# Patient Record
Sex: MALE | Race: OTHER | HISPANIC OR LATINO | URBAN - METROPOLITAN AREA
[De-identification: names, ages, dates, MRNs, and addresses within clinical notes are randomized per-mention and may not be internally consistent; named-entity substitution may affect disease eponyms.]

---

## 2021-07-09 ENCOUNTER — OFFICE VISIT (OUTPATIENT)
Dept: FAMILY MEDICINE | Facility: CLINIC | Age: 31
End: 2021-07-09

## 2021-07-09 DIAGNOSIS — R21 FACIAL RASH: Primary | ICD-10-CM

## 2021-07-09 PROCEDURE — 99203 OFFICE O/P NEW LOW 30 MIN: CPT | Mod: 95,,, | Performed by: FAMILY MEDICINE

## 2021-07-09 PROCEDURE — 99203 PR OFFICE/OUTPT VISIT, NEW, LEVL III, 30-44 MIN: ICD-10-PCS | Mod: 95,,, | Performed by: FAMILY MEDICINE

## 2021-07-09 RX ORDER — FLUCONAZOLE 150 MG/1
150 TABLET ORAL DAILY
Qty: 1 TABLET | Refills: 0 | Status: SHIPPED | OUTPATIENT
Start: 2021-07-09 | End: 2021-07-10

## 2021-07-09 RX ORDER — NYSTATIN AND TRIAMCINOLONE ACETONIDE 100000; 1 [USP'U]/G; MG/G
CREAM TOPICAL 2 TIMES DAILY
Qty: 60 G | Refills: 1 | Status: SHIPPED | OUTPATIENT
Start: 2021-07-09 | End: 2023-03-02

## 2023-03-01 ENCOUNTER — TELEPHONE (OUTPATIENT)
Dept: FAMILY MEDICINE | Facility: CLINIC | Age: 33
End: 2023-03-01
Payer: COMMERCIAL

## 2023-03-02 ENCOUNTER — OFFICE VISIT (OUTPATIENT)
Dept: FAMILY MEDICINE | Facility: CLINIC | Age: 33
End: 2023-03-02
Payer: COMMERCIAL

## 2023-03-02 VITALS
SYSTOLIC BLOOD PRESSURE: 138 MMHG | TEMPERATURE: 98 F | RESPIRATION RATE: 18 BRPM | WEIGHT: 217.13 LBS | HEIGHT: 67 IN | HEART RATE: 78 BPM | DIASTOLIC BLOOD PRESSURE: 80 MMHG | OXYGEN SATURATION: 99 % | BODY MASS INDEX: 34.08 KG/M2

## 2023-03-02 DIAGNOSIS — F41.9 ANXIETY AND DEPRESSION: ICD-10-CM

## 2023-03-02 DIAGNOSIS — F90.9 ATTENTION DEFICIT HYPERACTIVITY DISORDER (ADHD), UNSPECIFIED ADHD TYPE: ICD-10-CM

## 2023-03-02 DIAGNOSIS — Z23 NEED FOR TDAP VACCINATION: ICD-10-CM

## 2023-03-02 DIAGNOSIS — R21 FACIAL RASH: ICD-10-CM

## 2023-03-02 DIAGNOSIS — F32.A ANXIETY AND DEPRESSION: ICD-10-CM

## 2023-03-02 DIAGNOSIS — Z11.3 SCREEN FOR STD (SEXUALLY TRANSMITTED DISEASE): ICD-10-CM

## 2023-03-02 DIAGNOSIS — Z00.00 ANNUAL PHYSICAL EXAM: Primary | ICD-10-CM

## 2023-03-02 PROCEDURE — 99999 PR PBB SHADOW E&M-EST. PATIENT-LVL III: ICD-10-PCS | Mod: PBBFAC,,, | Performed by: FAMILY MEDICINE

## 2023-03-02 PROCEDURE — 3079F DIAST BP 80-89 MM HG: CPT | Mod: CPTII,S$GLB,, | Performed by: FAMILY MEDICINE

## 2023-03-02 PROCEDURE — 3075F PR MOST RECENT SYSTOLIC BLOOD PRESS GE 130-139MM HG: ICD-10-PCS | Mod: CPTII,S$GLB,, | Performed by: FAMILY MEDICINE

## 2023-03-02 PROCEDURE — 99999 PR PBB SHADOW E&M-EST. PATIENT-LVL III: CPT | Mod: PBBFAC,,, | Performed by: FAMILY MEDICINE

## 2023-03-02 PROCEDURE — 99395 PREV VISIT EST AGE 18-39: CPT | Mod: 25,S$GLB,, | Performed by: FAMILY MEDICINE

## 2023-03-02 PROCEDURE — 1159F PR MEDICATION LIST DOCUMENTED IN MEDICAL RECORD: ICD-10-PCS | Mod: CPTII,S$GLB,, | Performed by: FAMILY MEDICINE

## 2023-03-02 PROCEDURE — 3079F PR MOST RECENT DIASTOLIC BLOOD PRESSURE 80-89 MM HG: ICD-10-PCS | Mod: CPTII,S$GLB,, | Performed by: FAMILY MEDICINE

## 2023-03-02 PROCEDURE — 90471 IMMUNIZATION ADMIN: CPT | Mod: S$GLB,,, | Performed by: FAMILY MEDICINE

## 2023-03-02 PROCEDURE — 90471 TDAP VACCINE GREATER THAN OR EQUAL TO 7YO IM: ICD-10-PCS | Mod: S$GLB,,, | Performed by: FAMILY MEDICINE

## 2023-03-02 PROCEDURE — 99395 PR PREVENTIVE VISIT,EST,18-39: ICD-10-PCS | Mod: 25,S$GLB,, | Performed by: FAMILY MEDICINE

## 2023-03-02 PROCEDURE — 3008F PR BODY MASS INDEX (BMI) DOCUMENTED: ICD-10-PCS | Mod: CPTII,S$GLB,, | Performed by: FAMILY MEDICINE

## 2023-03-02 PROCEDURE — 3075F SYST BP GE 130 - 139MM HG: CPT | Mod: CPTII,S$GLB,, | Performed by: FAMILY MEDICINE

## 2023-03-02 PROCEDURE — 90715 TDAP VACCINE GREATER THAN OR EQUAL TO 7YO IM: ICD-10-PCS | Mod: S$GLB,,, | Performed by: FAMILY MEDICINE

## 2023-03-02 PROCEDURE — 3008F BODY MASS INDEX DOCD: CPT | Mod: CPTII,S$GLB,, | Performed by: FAMILY MEDICINE

## 2023-03-02 PROCEDURE — 1159F MED LIST DOCD IN RCRD: CPT | Mod: CPTII,S$GLB,, | Performed by: FAMILY MEDICINE

## 2023-03-02 PROCEDURE — 90715 TDAP VACCINE 7 YRS/> IM: CPT | Mod: S$GLB,,, | Performed by: FAMILY MEDICINE

## 2023-03-02 RX ORDER — NYSTATIN AND TRIAMCINOLONE ACETONIDE 100000; 1 [USP'U]/G; MG/G
CREAM TOPICAL 2 TIMES DAILY
Qty: 60 G | Refills: 1 | Status: SHIPPED | OUTPATIENT
Start: 2023-03-02

## 2023-03-02 NOTE — PROGRESS NOTES
Health Maintenance Due   Topic     Hepatitis C Screening  Consult pcp    Lipid Panel  Consult pcp    HIV Screening  Consult pcp    TETANUS VACCINE  Consult pcp    COVID-19 Vaccine (3 - Booster for Moderna series) Not offered at this office

## 2023-03-02 NOTE — PROGRESS NOTES
Pt tolerated tdap vaccine to left deltoid without difficulty; no adverse reaction noted; VIS given

## 2023-03-03 NOTE — PROGRESS NOTES
"Subjective:       Patient ID: Favio Diggs is a 32 y.o. male.    Chief Complaint: Annual Exam and Rash (forehead)      Rash    32-year-old male presents for annual exam.  States he would like refills on the cream that was given for his rash.  States it helps but then worsens with time.  Follows with psych for anxiety depression along with ADHD.  Denies any other issues.        Review of Systems   Constitutional: Negative.    HENT: Negative.     Respiratory: Negative.     Cardiovascular: Negative.    Gastrointestinal: Negative.    Endocrine: Negative.    Genitourinary: Negative.    Musculoskeletal: Negative.    Skin:  Positive for rash.   Neurological: Negative.    Psychiatric/Behavioral: Negative.          History reviewed. No pertinent past medical history.  History reviewed. No pertinent surgical history.  History reviewed. No pertinent family history.  Social History     Socioeconomic History    Marital status:        Current Outpatient Medications:     dextroamphetamine-amphetamine (ADDERALL XR) 15 MG 24 hr capsule, Take 1 capsule by mouth every morning, Disp: 30 capsule, Rfl: 0    EScitalopram oxalate (LEXAPRO) 20 MG tablet, TAKE 1 TABLET BY MOUTH EVERY DAY, Disp: 90 tablet, Rfl: 0    nystatin-triamcinolone (MYCOLOG II) cream, Apply topically 2 (two) times daily., Disp: 60 g, Rfl: 1   Objective:      Vitals:    03/02/23 1550   BP: 138/80   BP Location: Left arm   Patient Position: Sitting   BP Method: Small (Manual)   Pulse: 78   Resp: 18   Temp: 98.4 °F (36.9 °C)   TempSrc: Oral   SpO2: 99%   Weight: 98.5 kg (217 lb 2.5 oz)   Height: 5' 7" (1.702 m)       Physical Exam  Constitutional:       General: He is not in acute distress.  HENT:      Head: Normocephalic and atraumatic.   Eyes:      Conjunctiva/sclera: Conjunctivae normal.   Cardiovascular:      Rate and Rhythm: Normal rate and regular rhythm.      Heart sounds: Normal heart sounds. No murmur heard.    No friction rub. No gallop. "   Pulmonary:      Effort: Pulmonary effort is normal.      Breath sounds: Normal breath sounds. No wheezing or rales.   Musculoskeletal:      Cervical back: Neck supple.   Skin:     General: Skin is warm and dry.   Neurological:      Mental Status: He is alert and oriented to person, place, and time.   Psychiatric:         Behavior: Behavior normal.         Thought Content: Thought content normal.         Judgment: Judgment normal.          Assessment:       1. Annual physical exam    2. Facial rash    3. Screen for STD (sexually transmitted disease)    4. Attention deficit hyperactivity disorder (ADHD), unspecified ADHD type    5. Anxiety and depression    6. Need for Tdap vaccination          Plan:       Annual physical exam  -     CBC Auto Differential; Future; Expected date: 03/02/2023  -     Comprehensive Metabolic Panel; Future; Expected date: 03/02/2023  -     Hemoglobin A1C; Future; Expected date: 03/02/2023  -     TSH; Future; Expected date: 03/02/2023  -     Lipid Panel; Future; Expected date: 03/02/2023    Facial rash  -     nystatin-triamcinolone (MYCOLOG II) cream; Apply topically 2 (two) times daily.  Dispense: 60 g; Refill: 1  -     Ambulatory referral/consult to Dermatology; Future; Expected date: 03/09/2023    Screen for STD (sexually transmitted disease)  -     HIV 1/2 Ag/Ab (4th Gen); Future; Expected date: 03/02/2023  -     RPR; Future; Expected date: 03/02/2023  -     C. trachomatis/N. gonorrhoeae by AMP DNA [facility]; Urine; Future; Expected date: 03/02/2023  -     HEPATITIS C ANTIBODY; Future; Expected date: 03/02/2023    Attention deficit hyperactivity disorder (ADHD), unspecified ADHD type    Anxiety and depression    Need for Tdap vaccination  -     (In Office Administered) Tdap Vaccine    F/u labs. Cont w/ cream but advised pt to f/u w/ derm for eval due to reoccurrence.           Future Appointments   Date Time Provider Department Center   3/6/2023  7:30 AM [facility]    3/6/2023  7:45 AM [facility]       Patient note was created using DearLocal.  Any errors in syntax or even information may not have been identified and edited on initial review prior to signing this note.

## 2023-03-06 ENCOUNTER — TELEPHONE (OUTPATIENT)
Dept: FAMILY MEDICINE | Facility: CLINIC | Age: 33
End: 2023-03-06
Payer: COMMERCIAL

## 2023-03-06 ENCOUNTER — LAB VISIT (OUTPATIENT)
Dept: LAB | Facility: HOSPITAL | Age: 33
End: 2023-03-06
Attending: FAMILY MEDICINE
Payer: COMMERCIAL

## 2023-03-06 DIAGNOSIS — Z00.00 ANNUAL PHYSICAL EXAM: ICD-10-CM

## 2023-03-06 DIAGNOSIS — Z11.3 SCREEN FOR STD (SEXUALLY TRANSMITTED DISEASE): ICD-10-CM

## 2023-03-06 LAB
ALBUMIN SERPL BCP-MCNC: 4 G/DL (ref 3.5–5.2)
ALP SERPL-CCNC: 98 U/L (ref 55–135)
ALT SERPL W/O P-5'-P-CCNC: 41 U/L (ref 10–44)
ANION GAP SERPL CALC-SCNC: 8 MMOL/L (ref 8–16)
AST SERPL-CCNC: 25 U/L (ref 10–40)
BASOPHILS # BLD AUTO: 0.05 K/UL (ref 0–0.2)
BASOPHILS NFR BLD: 0.6 % (ref 0–1.9)
BILIRUB SERPL-MCNC: 0.4 MG/DL (ref 0.1–1)
BUN SERPL-MCNC: 9 MG/DL (ref 6–20)
CALCIUM SERPL-MCNC: 9.7 MG/DL (ref 8.7–10.5)
CHLORIDE SERPL-SCNC: 101 MMOL/L (ref 95–110)
CHOLEST SERPL-MCNC: 222 MG/DL (ref 120–199)
CHOLEST/HDLC SERPL: 5.3 {RATIO} (ref 2–5)
CO2 SERPL-SCNC: 29 MMOL/L (ref 23–29)
CREAT SERPL-MCNC: 1 MG/DL (ref 0.5–1.4)
DIFFERENTIAL METHOD: ABNORMAL
EOSINOPHIL # BLD AUTO: 0.3 K/UL (ref 0–0.5)
EOSINOPHIL NFR BLD: 3.2 % (ref 0–8)
ERYTHROCYTE [DISTWIDTH] IN BLOOD BY AUTOMATED COUNT: 13.3 % (ref 11.5–14.5)
EST. GFR  (NO RACE VARIABLE): >60 ML/MIN/1.73 M^2
ESTIMATED AVG GLUCOSE: 100 MG/DL (ref 68–131)
GLUCOSE SERPL-MCNC: 89 MG/DL (ref 70–110)
HBA1C MFR BLD: 5.1 % (ref 4–5.6)
HCT VFR BLD AUTO: 44.4 % (ref 40–54)
HCV AB SERPL QL IA: NORMAL
HDLC SERPL-MCNC: 42 MG/DL (ref 40–75)
HDLC SERPL: 18.9 % (ref 20–50)
HGB BLD-MCNC: 14.1 G/DL (ref 14–18)
HIV 1+2 AB+HIV1 P24 AG SERPL QL IA: NORMAL
IMM GRANULOCYTES # BLD AUTO: 0.01 K/UL (ref 0–0.04)
IMM GRANULOCYTES NFR BLD AUTO: 0.1 % (ref 0–0.5)
LDLC SERPL CALC-MCNC: 160 MG/DL (ref 63–159)
LYMPHOCYTES # BLD AUTO: 2.7 K/UL (ref 1–4.8)
LYMPHOCYTES NFR BLD: 34.9 % (ref 18–48)
MCH RBC QN AUTO: 27.6 PG (ref 27–31)
MCHC RBC AUTO-ENTMCNC: 31.8 G/DL (ref 32–36)
MCV RBC AUTO: 87 FL (ref 82–98)
MONOCYTES # BLD AUTO: 0.5 K/UL (ref 0.3–1)
MONOCYTES NFR BLD: 6.6 % (ref 4–15)
NEUTROPHILS # BLD AUTO: 4.2 K/UL (ref 1.8–7.7)
NEUTROPHILS NFR BLD: 54.6 % (ref 38–73)
NONHDLC SERPL-MCNC: 180 MG/DL
NRBC BLD-RTO: 0 /100 WBC
PLATELET # BLD AUTO: 297 K/UL (ref 150–450)
PMV BLD AUTO: 11.6 FL (ref 9.2–12.9)
POTASSIUM SERPL-SCNC: 3.7 MMOL/L (ref 3.5–5.1)
PROT SERPL-MCNC: 7.4 G/DL (ref 6–8.4)
RBC # BLD AUTO: 5.1 M/UL (ref 4.6–6.2)
SODIUM SERPL-SCNC: 138 MMOL/L (ref 136–145)
TRIGL SERPL-MCNC: 100 MG/DL (ref 30–150)
TSH SERPL DL<=0.005 MIU/L-ACNC: 1.43 UIU/ML (ref 0.4–4)
WBC # BLD AUTO: 7.76 K/UL (ref 3.9–12.7)

## 2023-03-06 PROCEDURE — 80061 LIPID PANEL: CPT | Performed by: FAMILY MEDICINE

## 2023-03-06 PROCEDURE — 84443 ASSAY THYROID STIM HORMONE: CPT | Performed by: FAMILY MEDICINE

## 2023-03-06 PROCEDURE — 86803 HEPATITIS C AB TEST: CPT | Performed by: FAMILY MEDICINE

## 2023-03-06 PROCEDURE — 87389 HIV-1 AG W/HIV-1&-2 AB AG IA: CPT | Performed by: FAMILY MEDICINE

## 2023-03-06 PROCEDURE — 36415 COLL VENOUS BLD VENIPUNCTURE: CPT | Mod: PO | Performed by: FAMILY MEDICINE

## 2023-03-06 PROCEDURE — 80053 COMPREHEN METABOLIC PANEL: CPT | Performed by: FAMILY MEDICINE

## 2023-03-06 PROCEDURE — 86592 SYPHILIS TEST NON-TREP QUAL: CPT | Performed by: FAMILY MEDICINE

## 2023-03-06 PROCEDURE — 83036 HEMOGLOBIN GLYCOSYLATED A1C: CPT | Performed by: FAMILY MEDICINE

## 2023-03-06 PROCEDURE — 85025 COMPLETE CBC W/AUTO DIFF WBC: CPT | Performed by: FAMILY MEDICINE

## 2023-03-06 NOTE — TELEPHONE ENCOUNTER
Spoke with patient states that he will schedule his appoint through the portal , referral will defer , verbalized understand .

## 2023-03-07 LAB — RPR SER QL: NORMAL

## 2024-09-24 ENCOUNTER — PATIENT MESSAGE (OUTPATIENT)
Dept: DERMATOLOGY | Facility: CLINIC | Age: 34
End: 2024-09-24
Payer: COMMERCIAL

## 2024-09-26 ENCOUNTER — TELEPHONE (OUTPATIENT)
Dept: DERMATOLOGY | Facility: CLINIC | Age: 34
End: 2024-09-26
Payer: COMMERCIAL

## 2024-09-27 ENCOUNTER — TELEPHONE (OUTPATIENT)
Dept: DERMATOLOGY | Facility: CLINIC | Age: 34
End: 2024-09-27
Payer: COMMERCIAL

## 2024-09-27 ENCOUNTER — PATIENT MESSAGE (OUTPATIENT)
Dept: DERMATOLOGY | Facility: CLINIC | Age: 34
End: 2024-09-27
Payer: COMMERCIAL

## 2024-10-25 ENCOUNTER — OFFICE VISIT (OUTPATIENT)
Dept: FAMILY MEDICINE | Facility: CLINIC | Age: 34
End: 2024-10-25
Payer: COMMERCIAL

## 2024-10-25 VITALS
BODY MASS INDEX: 34.26 KG/M2 | TEMPERATURE: 99 F | OXYGEN SATURATION: 99 % | WEIGHT: 218.25 LBS | HEART RATE: 83 BPM | SYSTOLIC BLOOD PRESSURE: 132 MMHG | HEIGHT: 67 IN | DIASTOLIC BLOOD PRESSURE: 60 MMHG | RESPIRATION RATE: 18 BRPM

## 2024-10-25 DIAGNOSIS — R21 FACIAL RASH: Primary | ICD-10-CM

## 2024-10-25 PROCEDURE — 99999 PR PBB SHADOW E&M-EST. PATIENT-LVL IV: CPT | Mod: PBBFAC,,,

## 2024-10-25 RX ORDER — NYSTATIN AND TRIAMCINOLONE ACETONIDE 100000; 1 [USP'U]/G; MG/G
CREAM TOPICAL 2 TIMES DAILY
Qty: 60 G | Refills: 1 | Status: SHIPPED | OUTPATIENT
Start: 2024-10-25

## 2024-10-25 RX ORDER — FLUCONAZOLE 150 MG/1
150 TABLET ORAL DAILY
Qty: 1 TABLET | Refills: 0 | Status: SHIPPED | OUTPATIENT
Start: 2024-10-25 | End: 2024-10-26

## 2024-10-25 NOTE — PROGRESS NOTES
Family Medicine     Patient name: Favio Diggs  MRN: [id number]  : 1990  PCP NAME: Della Mcallister MD    Subjective     History of Present Illness:  Patient ID: Favio Diggs is a 33 y.o. Other male presents to the clinic today. Chronic medical issues, if present, have been documented.   There are no active problems to display for this patient.      Chief Complaint  Chief Complaint   Patient presents with    Rash     Rash on face and ankle      Complains of rash on the face.  Intermittent over the last 3 years.  Pruritic.  Has responded to topical antifungal plus steroids.     Medications   Medication List with Changes/Refills   New Medications    FLUCONAZOLE (DIFLUCAN) 150 MG TAB    Take 1 tablet (150 mg total) by mouth once daily. for 1 day   Current Medications    DEXTROAMPHETAMINE-AMPHETAMINE (ADDERALL XR) 10 MG 24 HR CAPSULE    Take 1 capsule by mouth every morning    DEXTROAMPHETAMINE-AMPHETAMINE (ADDERALL XR) 15 MG 24 HR CAPSULE    Take 1 capsule by mouth every morning    DEXTROAMPHETAMINE-AMPHETAMINE (ADDERALL XR) 15 MG 24 HR CAPSULE    Take 1 capsule by mouth every morning    ESCITALOPRAM OXALATE (LEXAPRO) 20 MG TABLET    TAKE 1 TABLET BY MOUTH EVERY DAY    ESCITALOPRAM OXALATE (LEXAPRO) 20 MG TABLET    TAKE 1 TABLET BY MOUTH EVERY DAY    ESCITALOPRAM OXALATE (LEXAPRO) 20 MG TABLET    TAKE 1 TABLET BY MOUTH EVERY DAY   Changed and/or Refilled Medications    Modified Medication Previous Medication    NYSTATIN-TRIAMCINOLONE (MYCOLOG II) CREAM nystatin-triamcinolone (MYCOLOG II) cream       Apply topically 2 (two) times daily.    Apply topically 2 (two) times daily.       Allergies  Review of patient's allergies indicates:  No Known Allergies  Past Medical history  History reviewed. No pertinent past medical history.       Surgical History  History reviewed. No pertinent surgical history.  Family History  No family history on file.  Social History     "      Review of system     ROS  Negative except as mentioned above  Physical exam   Vital Signs  Vitals:    10/25/24 1455   BP: 132/60   Pulse: 83   Resp: 18   Temp: 98.5 °F (36.9 °C)   TempSrc: Oral   SpO2: 99%   Weight: 99 kg (218 lb 4.1 oz)   Height: 5' 7" (1.702 m)       Physical Exam  Constitutional:       General: He is not in acute distress.     Appearance: He is not ill-appearing.   HENT:      Head: Normocephalic.   Cardiovascular:      Rate and Rhythm: Normal rate and regular rhythm.      Pulses: Normal pulses.      Heart sounds: Normal heart sounds. No murmur heard.     No gallop.   Pulmonary:      Effort: Pulmonary effort is normal. No respiratory distress.      Breath sounds: Normal breath sounds. No wheezing.   Abdominal:      General: There is no distension.      Palpations: Abdomen is soft.      Tenderness: There is no abdominal tenderness.   Musculoskeletal:      Right lower leg: No edema.      Left lower leg: No edema.   Skin:     Findings: Rash (face and ankle (see image)) present.   Neurological:      Mental Status: He is alert and oriented to person, place, and time.   Psychiatric:         Mood and Affect: Mood normal.           Wt Readings from Last 3 Encounters:   10/25/24 99 kg (218 lb 4.1 oz)   03/02/23 98.5 kg (217 lb 2.5 oz)          Body mass index is 34.18 kg/m².      Laboratory data and other diagnostic findings     Lab Results   Component Value Date    WBC 7.76 03/06/2023    HGB 14.1 03/06/2023    HCT 44.4 03/06/2023    MCV 87 03/06/2023     03/06/2023         Lab Results   Component Value Date    CREATININE 1.0 03/06/2023    BUN 9 03/06/2023     03/06/2023    K 3.7 03/06/2023     03/06/2023    CO2 29 03/06/2023         Assessment and plan       Facial rash  Reviewed past documentation other providers.  He has had similar complaints in the past.  Rash looks eczematoid.  Since he has responded to topical and oral combination in the past, we will send in script  -     " fluconazole (DIFLUCAN) 150 MG Tab; Take 1 tablet (150 mg total) by mouth once daily. for 1 day  Dispense: 1 tablet; Refill: 0  -     nystatin-triamcinolone (MYCOLOG II) cream; Apply topically 2 (two) times daily.  Dispense: 60 g; Refill: 1  -     COMPREHENSIVE METABOLIC PANEL; Future; Expected date: 10/25/2024  -     IGE; Future; Expected date: 10/25/2024  -     CBC W/ AUTO DIFFERENTIAL; Future; Expected date: 10/25/2024        Problem List Items Addressed This Visit    None  Visit Diagnoses       Facial rash    -  Primary    Relevant Medications    fluconazole (DIFLUCAN) 150 MG Tab    nystatin-triamcinolone (MYCOLOG II) cream    Other Relevant Orders    COMPREHENSIVE METABOLIC PANEL    IGE    CBC W/ AUTO DIFFERENTIAL                Medications Administered this visit       Future Appointments  Future Appointments   Date Time Provider Department Center   1/31/2025  2:00 PM Janusz Estrada MD [facility]         No follow-ups on file. and PRN.      Della Mcallister MD    This office note was created by combination of typed  and MModal dictation.  Transcription errors may be present.  If there are any questions, please contact me.

## 2024-10-29 ENCOUNTER — LAB VISIT (OUTPATIENT)
Dept: LAB | Facility: HOSPITAL | Age: 34
End: 2024-10-29
Payer: COMMERCIAL

## 2024-10-29 DIAGNOSIS — R21 FACIAL RASH: ICD-10-CM

## 2024-10-29 LAB
ALBUMIN SERPL BCP-MCNC: 4.2 G/DL (ref 3.5–5.2)
ALP SERPL-CCNC: 97 U/L (ref 40–150)
ALT SERPL W/O P-5'-P-CCNC: 30 U/L (ref 10–44)
ANION GAP SERPL CALC-SCNC: 11 MMOL/L (ref 8–16)
AST SERPL-CCNC: 22 U/L (ref 10–40)
BASOPHILS # BLD AUTO: 0.07 K/UL (ref 0–0.2)
BASOPHILS NFR BLD: 0.8 % (ref 0–1.9)
BILIRUB SERPL-MCNC: 0.5 MG/DL (ref 0.1–1)
BUN SERPL-MCNC: 8 MG/DL (ref 6–20)
CALCIUM SERPL-MCNC: 9.8 MG/DL (ref 8.7–10.5)
CHLORIDE SERPL-SCNC: 103 MMOL/L (ref 95–110)
CO2 SERPL-SCNC: 27 MMOL/L (ref 23–29)
CREAT SERPL-MCNC: 1.1 MG/DL (ref 0.5–1.4)
DIFFERENTIAL METHOD BLD: NORMAL
EOSINOPHIL # BLD AUTO: 0.1 K/UL (ref 0–0.5)
EOSINOPHIL NFR BLD: 1.5 % (ref 0–8)
ERYTHROCYTE [DISTWIDTH] IN BLOOD BY AUTOMATED COUNT: 13.5 % (ref 11.5–14.5)
EST. GFR  (NO RACE VARIABLE): >60 ML/MIN/1.73 M^2
GLUCOSE SERPL-MCNC: 90 MG/DL (ref 70–110)
HCT VFR BLD AUTO: 46.8 % (ref 40–54)
HGB BLD-MCNC: 15.2 G/DL (ref 14–18)
IGE SERPL-ACNC: 118 IU/ML (ref 0–100)
IMM GRANULOCYTES # BLD AUTO: 0.02 K/UL (ref 0–0.04)
IMM GRANULOCYTES NFR BLD AUTO: 0.2 % (ref 0–0.5)
LYMPHOCYTES # BLD AUTO: 3 K/UL (ref 1–4.8)
LYMPHOCYTES NFR BLD: 35.3 % (ref 18–48)
MCH RBC QN AUTO: 27.9 PG (ref 27–31)
MCHC RBC AUTO-ENTMCNC: 32.5 G/DL (ref 32–36)
MCV RBC AUTO: 86 FL (ref 82–98)
MONOCYTES # BLD AUTO: 0.5 K/UL (ref 0.3–1)
MONOCYTES NFR BLD: 6.3 % (ref 4–15)
NEUTROPHILS # BLD AUTO: 4.7 K/UL (ref 1.8–7.7)
NEUTROPHILS NFR BLD: 55.9 % (ref 38–73)
NRBC BLD-RTO: 0 /100 WBC
PLATELET # BLD AUTO: 299 K/UL (ref 150–450)
PMV BLD AUTO: 11.8 FL (ref 9.2–12.9)
POTASSIUM SERPL-SCNC: 4.1 MMOL/L (ref 3.5–5.1)
PROT SERPL-MCNC: 7.8 G/DL (ref 6–8.4)
RBC # BLD AUTO: 5.45 M/UL (ref 4.6–6.2)
SODIUM SERPL-SCNC: 141 MMOL/L (ref 136–145)
WBC # BLD AUTO: 8.45 K/UL (ref 3.9–12.7)

## 2024-10-29 PROCEDURE — 82785 ASSAY OF IGE: CPT

## 2024-10-29 PROCEDURE — 85025 COMPLETE CBC W/AUTO DIFF WBC: CPT

## 2024-10-29 PROCEDURE — 80053 COMPREHEN METABOLIC PANEL: CPT

## 2024-10-29 PROCEDURE — 36415 COLL VENOUS BLD VENIPUNCTURE: CPT | Mod: PO

## 2025-01-17 ENCOUNTER — OFFICE VISIT (OUTPATIENT)
Dept: FAMILY MEDICINE | Facility: CLINIC | Age: 35
End: 2025-01-17
Payer: COMMERCIAL

## 2025-01-17 VITALS
HEIGHT: 67 IN | RESPIRATION RATE: 18 BRPM | WEIGHT: 216.94 LBS | SYSTOLIC BLOOD PRESSURE: 189 MMHG | OXYGEN SATURATION: 91 % | BODY MASS INDEX: 34.05 KG/M2 | DIASTOLIC BLOOD PRESSURE: 113 MMHG | HEART RATE: 88 BPM | TEMPERATURE: 99 F

## 2025-01-17 DIAGNOSIS — R03.0 ELEVATED BLOOD PRESSURE READING: ICD-10-CM

## 2025-01-17 DIAGNOSIS — F90.9 ATTENTION DEFICIT HYPERACTIVITY DISORDER (ADHD), UNSPECIFIED ADHD TYPE: ICD-10-CM

## 2025-01-17 DIAGNOSIS — Z00.00 ANNUAL PHYSICAL EXAM: Primary | ICD-10-CM

## 2025-01-17 PROCEDURE — 99395 PREV VISIT EST AGE 18-39: CPT | Mod: S$GLB,,,

## 2025-01-17 PROCEDURE — 3080F DIAST BP >= 90 MM HG: CPT | Mod: CPTII,S$GLB,,

## 2025-01-17 PROCEDURE — 3077F SYST BP >= 140 MM HG: CPT | Mod: CPTII,S$GLB,,

## 2025-01-17 PROCEDURE — 3008F BODY MASS INDEX DOCD: CPT | Mod: CPTII,S$GLB,,

## 2025-01-17 PROCEDURE — 1160F RVW MEDS BY RX/DR IN RCRD: CPT | Mod: CPTII,S$GLB,,

## 2025-01-17 PROCEDURE — 99999 PR PBB SHADOW E&M-EST. PATIENT-LVL IV: CPT | Mod: PBBFAC,,,

## 2025-01-17 PROCEDURE — 1159F MED LIST DOCD IN RCRD: CPT | Mod: CPTII,S$GLB,,

## 2025-01-17 NOTE — PROGRESS NOTES
Family Medicine     Patient name: Favio Diggs  MRN: [id number]  : 1990  PCP NAME: Della Mcallister MD    Active Problem List with Overview Notes    Diagnosis Date Noted    Attention deficit hyperactivity disorder (ADHD) 2025       History of Present Illness    Patient presents today for annual physical exam   Currently feels at baseline. Works with [employer] in the [employer] department. He reports that his job is very stressful.    He reports experiencing palpitations described as skipping or fluttering sensations for approximately one year, occurring once or twice per month. He denies associated chest pain, shortness of breath, or syncope. He denies history of high blood pressure or concerning blood pressure readings. He does not check blood pressure at home and has never been on blood pressure medication.    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    He reports consuming large amounts of caffeine.    His mother has hypertension. He denies family history of cardiac disease/sudden death before age 50.    ROS:  General: -fever, -chills, -fatigue, -weight gain, -weight loss  Eyes: -vision changes, -redness, -discharge  ENT: -ear pain, -nasal congestion, -sore throat  Cardiovascular: -chest pain, +palpitations, -lower extremity edema  Respiratory: -cough, -shortness of breath  Gastrointestinal: -abdominal pain, -nausea, -vomiting, -diarrhea, -constipation, -blood in stool  Genitourinary: -dysuria, -hematuria, -frequency  Musculoskeletal: -joint pain, -muscle pain  Skin: -rash, -lesion  Neurological: -headache, -dizziness, -numbness, -tingling  Psychiatric: -anxiety, -depression, -sleep difficulty          History reviewed. No pertinent past medical history.    History reviewed. No pertinent surgical history.     No family history on file.     Social History     Socioeconomic History     "Marital status:      Social Drivers of Health     Financial Resource Strain: Medium Risk (10/24/2024)    Overall Financial Resource Strain (CARDIA)     Difficulty of Paying Living Expenses: Somewhat hard   Food Insecurity: No Food Insecurity (10/24/2024)    Hunger Vital Sign     Worried About Running Out of Food in the Last Year: Never true     Ran Out of Food in the Last Year: Never true   Physical Activity: Insufficiently Active (10/24/2024)    Exercise Vital Sign     Days of Exercise per Week: 2 days     Minutes of Exercise per Session: 10 min   Stress: Stress Concern Present (10/24/2024)    [employer] [place] [employer] - Occupational Stress Questionnaire     Feeling of Stress : To some extent   Housing Stability: Unknown (10/24/2024)    Housing Stability Vital Sign     Unable to Pay for Housing in the Last Year: No       BP (!) 189/113   Pulse 88   Temp 98.7 °F (37.1 °C) (Oral)   Resp 18   Ht 5' 7" (1.702 m)   Wt 98.4 kg (216 lb 14.9 oz)   SpO2 (!) 91%   BMI 33.98 kg/m²     Physical Exam    General: No acute distress. Well-developed. Well-nourished.  Eyes: EOMI. Sclerae anicteric.  HENT: Normocephalic. Atraumatic. Nares patent. Moist oral mucosa.  Ears: Bilateral TMs clear. Bilateral EACs clear.  Cardiovascular: Regular rate. Regular rhythm. No murmurs. No rubs. No gallops. Normal S1, S2.  Respiratory: Normal respiratory effort. Clear to auscultation bilaterally. No rales. No rhonchi. No wheezing.  Abdomen: Soft. Non-tender. Non-distended. Normoactive bowel sounds.  Musculoskeletal: No  obvious deformity.  Extremities: No lower extremity edema.  Neurological: Alert & oriented x3. No slurred speech. Normal gait.  Psychiatric: Normal mood. Normal affect. Good insight. Good judgment.  Skin: Warm. Dry. No rash.            Assessment & Plan         Favio Desir" was seen today for annual exam and heart problem.    Diagnoses and all orders for this visit:    Annual physical " "exam  Counseled on age appropriate medical preventative services, including age appropriate cancer screenings, over all nutritional health, need for a consistent exercise regimen and an over all push towards maintaining a vigorous and active lifestyle. Counseled on age appropriate vaccines and discussed upcoming health care needs based on age/gender. Spent time with patient counseling on need for a good patient/doctor relationship moving forward. Discussed use of common OTC medications and supplements. Discussed common dietary aids and use of caffeine and the need for good sleep hygiene and stress management.  Recommend daily sun protection/avoidance and use of at least SPF 30   STRESS MANAGEMENT:  - Discussed the importance of stress reduction and disconnecting from digital devices for overall health.  - Noted that the patient reports being busy and having limited relaxation activities, mainly playing games on phone or console.  - Observed the patient's lack of disconnected relaxation time and constant digital connectivity.  - Explained the benefits of meditation and mindfulness practices for stress management.  - Instructed the patient to practice daily meditation or mindfulness for 10 minutes, focusing on breathing and relaxation.  - Recommend daily relaxation practices and limiting Adderall use to workdays only to manage work-related stress.  - Recommend implementing regular "disconnect" periods from digital devices, including phone and smartwatch.  - Emphasized the importance of disconnecting and taking time to decompress without digital stimuli.  -     Lipid Panel; Future  -     TSH; Future  -     Hemoglobin A1C; Future  -     Comprehensive Metabolic Panel; Future  -     CBC Auto Differential; Future  -     Urinalysis; Future    Elevated blood pressure reading  Observed high systolic blood pressure of 170s and 180s  and acknowledged that patient's use of caffeine and adderall can elevate blood pressure and " increase heart rate.  -     Urinalysis; Future  -     Aldosterone/Renin Activity Ratio; Future  -     Microalbumin/Creatinine Ratio, Urine; Future    Attention deficit hyperactivity disorder (ADHD), unspecified ADHD type        Recommend limiting Adderall use to workdays (Monday to Friday) only.        Problem List Items Addressed This Visit       Attention deficit hyperactivity disorder (ADHD)     Other Visit Diagnoses       Annual physical exam    -  Primary    Relevant Orders    Lipid Panel    TSH    Hemoglobin A1C    Comprehensive Metabolic Panel    CBC Auto Differential    Urinalysis    Elevated blood pressure reading        Relevant Orders    Urinalysis    Aldosterone/Renin Activity Ratio    Microalbumin/Creatinine Ratio, Urine              Medication List with Changes/Refills   Current Medications    DEXTROAMPHETAMINE-AMPHETAMINE (ADDERALL XR) 10 MG 24 HR CAPSULE    Take 1 capsule by mouth every morning    DEXTROAMPHETAMINE-AMPHETAMINE (ADDERALL XR) 15 MG 24 HR CAPSULE    Take 1 capsule by mouth every morning    DEXTROAMPHETAMINE-AMPHETAMINE (ADDERALL XR) 15 MG 24 HR CAPSULE    Take 1 capsule by mouth every morning    ESCITALOPRAM OXALATE (LEXAPRO) 20 MG TABLET    TAKE 1 TABLET BY MOUTH EVERY DAY    ESCITALOPRAM OXALATE (LEXAPRO) 20 MG TABLET    TAKE 1 TABLET BY MOUTH EVERY DAY    ESCITALOPRAM OXALATE (LEXAPRO) 20 MG TABLET    TAKE 1 TABLET BY MOUTH EVERY DAY    NYSTATIN-TRIAMCINOLONE (MYCOLOG II) CREAM    Apply topically 2 (two) times daily.         Follow up for 1 week for blood pressure check .    Della Mcallister MD        This note was generated with the assistance of ambient listening technology. Verbal consent was obtained by the patient and accompanying visitor(s) for the recording of patient appointment to facilitate this note. I attest to having reviewed and edited the generated note for accuracy, though some syntax or spelling errors may persist. Please contact the author of this note for any  clarification.

## 2025-01-17 NOTE — PROGRESS NOTES
Family Medicine     Patient name: Favio Diggs  MRN: [id number]  : 1990  PCP NAME: Della Mcallister MD    Subjective       Patient ID: Favio Diggs is a 34 y.o. Other male presents to the clinic today for an annual physical exam. Chronic medical issues, if present, have been documented.   Active Problem List with Overview Notes    Diagnosis Date Noted    Attention deficit hyperactivity disorder (ADHD) 2025       Current History  Chief Complaint   Patient presents with    Annual Exam     Heart health     Heart Problem     Heart palpitations        History of Present Illness    Patient presents today for annual physical exam   Currently feels at baseline. Works with [employer] in the [employer] department.   He reports experiencing palpitations described as skipping or fluttering sensations for approximately one year, occurring once or twice per month. He denies associated chest pain, shortness of breath, or syncope. He denies history of high blood pressure or concerning blood pressure readings. He does not check blood pressure at home and has never been on blood pressure medication.    He has been taking Adderall since approximately 9942-1594, reporting it helps him stay focused with data tasks and provides energy.    He reports consuming large amounts of caffeine.    His mother has hypertension. He denies family history of cardiac disease/sudden death before age 50.      ROS:  General: -fever, -chills, -fatigue, -weight gain, -weight loss  Eyes: -vision changes, -redness, -discharge  ENT: -ear pain, -nasal congestion, -sore throat  Cardiovascular: -chest pain, +palpitations, -lower extremity edema  Respiratory: -cough, -shortness of breath  Gastrointestinal: -abdominal pain, -nausea, -vomiting, -diarrhea, -constipation, -blood in stool  Genitourinary: -dysuria, -hematuria, -frequency  Musculoskeletal: -joint pain, -muscle pain  Skin: -rash, -lesion  Neurological:  "-headache, -dizziness, -numbness, -tingling  Psychiatric: -anxiety, -depression, -sleep difficulty          Medications   Medication List with Changes/Refills   Current Medications    DEXTROAMPHETAMINE-AMPHETAMINE (ADDERALL XR) 10 MG 24 HR CAPSULE    Take 1 capsule by mouth every morning    DEXTROAMPHETAMINE-AMPHETAMINE (ADDERALL XR) 15 MG 24 HR CAPSULE    Take 1 capsule by mouth every morning    DEXTROAMPHETAMINE-AMPHETAMINE (ADDERALL XR) 15 MG 24 HR CAPSULE    Take 1 capsule by mouth every morning    ESCITALOPRAM OXALATE (LEXAPRO) 20 MG TABLET    TAKE 1 TABLET BY MOUTH EVERY DAY    ESCITALOPRAM OXALATE (LEXAPRO) 20 MG TABLET    TAKE 1 TABLET BY MOUTH EVERY DAY    ESCITALOPRAM OXALATE (LEXAPRO) 20 MG TABLET    TAKE 1 TABLET BY MOUTH EVERY DAY    NYSTATIN-TRIAMCINOLONE (MYCOLOG II) CREAM    Apply topically 2 (two) times daily.       Allergies  Review of patient's allergies indicates:  No Known Allergies  Past Medical history  History reviewed. No pertinent past medical history.       Surgical History  History reviewed. No pertinent surgical history.  Family History  No family history on file.  Social History        Health Maintainence:   Health Maintenance Due   Topic Date Due    COVID-19 Vaccine (3 - 2024-25 season) 09/01/2024          Review of system     ROS  Negative except as mentioned above  Physical exam   Vital Signs  Vitals:    01/17/25 1621 01/17/25 1646   BP: (!) 172/118 (!) 189/113   Pulse: 88    Resp: 18    Temp: 98.7 °F (37.1 °C)    TempSrc: Oral    SpO2: (!) 91%    Weight: 98.4 kg (216 lb 14.9 oz)    Height: 5' 7" (1.702 m)        Physical Exam  Constitutional:       General: He is not in acute distress.     Appearance: He is not ill-appearing.   HENT:      Head: Normocephalic.   Cardiovascular:      Rate and Rhythm: Normal rate and regular rhythm.      Pulses: Normal pulses.      Heart sounds: Normal heart sounds. No murmur heard.     No gallop.   Pulmonary:      Effort: Pulmonary effort is normal. " No respiratory distress.      Breath sounds: Normal breath sounds. No wheezing.   Abdominal:      General: There is no distension.      Palpations: Abdomen is soft.      Tenderness: There is no abdominal tenderness.   Musculoskeletal:      Right lower leg: No edema.      Left lower leg: No edema.   Neurological:      Mental Status: He is alert and oriented to person, place, and time.   Psychiatric:         Mood and Affect: Mood normal.       Laboratory data and other diagnostic findings     Previous labs reviewed.    Lab Results   Component Value Date    WBC 8.45 10/29/2024    HGB 15.2 10/29/2024    HCT 46.8 10/29/2024     10/29/2024    CHOL 222 (H) 03/06/2023    TRIG 100 03/06/2023    HDL 42 03/06/2023    ALT 30 10/29/2024    AST 22 10/29/2024     10/29/2024    K 4.1 10/29/2024     10/29/2024    CREATININE 1.1 10/29/2024    BUN 8 10/29/2024    CO2 27 10/29/2024    TSH 1.426 03/06/2023    HGBA1C 5.1 03/06/2023       Assessment and plan       Annual physical exam  -     Lipid Panel; Future; Expected date: 01/17/2025  -     TSH; Future; Expected date: 01/17/2025  -     Hemoglobin A1C; Future; Expected date: 01/17/2025  -     Comprehensive Metabolic Panel; Future; Expected date: 01/17/2025  -     CBC Auto Differential; Future; Expected date: 01/17/2025  -     Urinalysis; Future; Expected date: 01/17/2025    Elevated blood pressure reading  -     Urinalysis; Future; Expected date: 01/17/2025  -     Aldosterone/Renin Activity Ratio; Future; Expected date: 01/17/2025  -     Microalbumin/Creatinine Ratio, Urine; Future; Expected date: 01/17/2025    Attention deficit hyperactivity disorder (ADHD), unspecified ADHD type           Problem List Items Addressed This Visit       Attention deficit hyperactivity disorder (ADHD)     Other Visit Diagnoses       Annual physical exam    -  Primary    Relevant Orders    Lipid Panel    TSH    Hemoglobin A1C    Comprehensive Metabolic Panel    CBC Auto Differential     Urinalysis    Elevated blood pressure reading        Relevant Orders    Urinalysis    Aldosterone/Renin Activity Ratio    Microalbumin/Creatinine Ratio, Urine                Medications Administered this visit       Future Appointments  Future Appointments   Date Time Provider Department [place]   1/24/2025  7:00 AM LAB, [facility] [place]   1/24/2025  8:20 AM NURSE, [facility] [place]         No follow-ups on file. and PRN.      Della Mcallister MD    This office note was created by combination of typed  and MModal dictation.  Transcription errors may be present.  If there are any questions, please contact me.     This note was generated with the assistance of ambient listening technology. Verbal consent was obtained by the patient and accompanying visitor(s) for the recording of patient appointment to facilitate this note. I attest to having reviewed and edited the generated note for accuracy, though some syntax or spelling errors may persist. Please contact the author of this note for any clarification.       ***  minutes of total time spent on the encounter, which includes face to face time and non-face to face time preparing to see the patient (eg, review of tests), Obtaining and/or reviewing separately obtained history, Documenting clinical information in the electronic or other health record, Independently interpreting results (not separately reported) and communicating results to the patient/family/caregiver, or Care coordination (not separately reported).

## 2025-01-24 ENCOUNTER — LAB VISIT (OUTPATIENT)
Dept: LAB | Facility: HOSPITAL | Age: 35
End: 2025-01-24
Payer: COMMERCIAL

## 2025-01-24 DIAGNOSIS — Z00.00 ANNUAL PHYSICAL EXAM: ICD-10-CM

## 2025-01-24 LAB
ALBUMIN SERPL BCP-MCNC: 4.3 G/DL (ref 3.5–5.2)
ALP SERPL-CCNC: 91 U/L (ref 40–150)
ALT SERPL W/O P-5'-P-CCNC: 34 U/L (ref 10–44)
ANION GAP SERPL CALC-SCNC: 9 MMOL/L (ref 8–16)
AST SERPL-CCNC: 23 U/L (ref 10–40)
BASOPHILS # BLD AUTO: 0.07 K/UL (ref 0–0.2)
BASOPHILS NFR BLD: 0.9 % (ref 0–1.9)
BILIRUB SERPL-MCNC: 0.5 MG/DL (ref 0.1–1)
BUN SERPL-MCNC: 10 MG/DL (ref 6–20)
CALCIUM SERPL-MCNC: 9.6 MG/DL (ref 8.7–10.5)
CHLORIDE SERPL-SCNC: 104 MMOL/L (ref 95–110)
CHOLEST SERPL-MCNC: 235 MG/DL (ref 120–199)
CHOLEST/HDLC SERPL: 6.2 {RATIO} (ref 2–5)
CO2 SERPL-SCNC: 28 MMOL/L (ref 23–29)
CREAT SERPL-MCNC: 1 MG/DL (ref 0.5–1.4)
DIFFERENTIAL METHOD BLD: NORMAL
EOSINOPHIL # BLD AUTO: 0.1 K/UL (ref 0–0.5)
EOSINOPHIL NFR BLD: 1.4 % (ref 0–8)
ERYTHROCYTE [DISTWIDTH] IN BLOOD BY AUTOMATED COUNT: 13.3 % (ref 11.5–14.5)
EST. GFR  (NO RACE VARIABLE): >60 ML/MIN/1.73 M^2
ESTIMATED AVG GLUCOSE: 97 MG/DL (ref 68–131)
GLUCOSE SERPL-MCNC: 86 MG/DL (ref 70–110)
HBA1C MFR BLD: 5 % (ref 4–5.6)
HCT VFR BLD AUTO: 46.2 % (ref 40–54)
HDLC SERPL-MCNC: 38 MG/DL (ref 40–75)
HDLC SERPL: 16.2 % (ref 20–50)
HGB BLD-MCNC: 14.9 G/DL (ref 14–18)
IMM GRANULOCYTES # BLD AUTO: 0.01 K/UL (ref 0–0.04)
IMM GRANULOCYTES NFR BLD AUTO: 0.1 % (ref 0–0.5)
LDLC SERPL CALC-MCNC: 177 MG/DL (ref 63–159)
LYMPHOCYTES # BLD AUTO: 3.1 K/UL (ref 1–4.8)
LYMPHOCYTES NFR BLD: 39.6 % (ref 18–48)
MCH RBC QN AUTO: 27.7 PG (ref 27–31)
MCHC RBC AUTO-ENTMCNC: 32.3 G/DL (ref 32–36)
MCV RBC AUTO: 86 FL (ref 82–98)
MONOCYTES # BLD AUTO: 0.5 K/UL (ref 0.3–1)
MONOCYTES NFR BLD: 6.9 % (ref 4–15)
NEUTROPHILS # BLD AUTO: 4 K/UL (ref 1.8–7.7)
NEUTROPHILS NFR BLD: 51.1 % (ref 38–73)
NONHDLC SERPL-MCNC: 197 MG/DL
NRBC BLD-RTO: 0 /100 WBC
PLATELET # BLD AUTO: 286 K/UL (ref 150–450)
PMV BLD AUTO: 11.5 FL (ref 9.2–12.9)
POTASSIUM SERPL-SCNC: 4.8 MMOL/L (ref 3.5–5.1)
PROT SERPL-MCNC: 8.1 G/DL (ref 6–8.4)
RBC # BLD AUTO: 5.38 M/UL (ref 4.6–6.2)
SODIUM SERPL-SCNC: 141 MMOL/L (ref 136–145)
TRIGL SERPL-MCNC: 100 MG/DL (ref 30–150)
TSH SERPL DL<=0.005 MIU/L-ACNC: 1.32 UIU/ML (ref 0.4–4)
WBC # BLD AUTO: 7.85 K/UL (ref 3.9–12.7)

## 2025-01-24 PROCEDURE — 83036 HEMOGLOBIN GLYCOSYLATED A1C: CPT

## 2025-01-24 PROCEDURE — 85025 COMPLETE CBC W/AUTO DIFF WBC: CPT

## 2025-01-24 PROCEDURE — 80053 COMPREHEN METABOLIC PANEL: CPT

## 2025-01-24 PROCEDURE — 80061 LIPID PANEL: CPT

## 2025-01-24 PROCEDURE — 99285 EMERGENCY DEPT VISIT HI MDM: CPT | Mod: 25

## 2025-01-24 PROCEDURE — 84443 ASSAY THYROID STIM HORMONE: CPT

## 2025-01-24 PROCEDURE — 36415 COLL VENOUS BLD VENIPUNCTURE: CPT | Mod: PO

## 2025-01-25 ENCOUNTER — HOSPITAL ENCOUNTER (EMERGENCY)
Facility: HOSPITAL | Age: 35
Discharge: HOME | End: 2025-01-25
Attending: STUDENT IN AN ORGANIZED HEALTH CARE EDUCATION/TRAINING PROGRAM
Payer: COMMERCIAL

## 2025-01-25 VITALS
HEIGHT: 67 IN | DIASTOLIC BLOOD PRESSURE: 97 MMHG | RESPIRATION RATE: 18 BRPM | BODY MASS INDEX: 33.9 KG/M2 | HEART RATE: 85 BPM | WEIGHT: 216 LBS | OXYGEN SATURATION: 97 % | SYSTOLIC BLOOD PRESSURE: 142 MMHG | TEMPERATURE: 98 F

## 2025-01-25 DIAGNOSIS — R03.0 ELEVATED BLOOD PRESSURE READING: ICD-10-CM

## 2025-01-25 LAB
ALBUMIN SERPL BCP-MCNC: 4.5 G/DL (ref 3.5–5.2)
ALP SERPL-CCNC: 109 U/L (ref 40–150)
ALT SERPL W/O P-5'-P-CCNC: 42 U/L (ref 10–44)
ANION GAP SERPL CALC-SCNC: 9 MMOL/L (ref 8–16)
AST SERPL-CCNC: 27 U/L (ref 10–40)
BASOPHILS # BLD AUTO: 0.06 K/UL (ref 0–0.2)
BASOPHILS NFR BLD: 0.6 % (ref 0–1.9)
BILIRUB SERPL-MCNC: 0.4 MG/DL (ref 0.1–1)
BNP SERPL-MCNC: 15 PG/ML (ref 0–99)
BUN SERPL-MCNC: 8 MG/DL (ref 6–20)
CALCIUM SERPL-MCNC: 9.9 MG/DL (ref 8.7–10.5)
CHLORIDE SERPL-SCNC: 104 MMOL/L (ref 95–110)
CO2 SERPL-SCNC: 26 MMOL/L (ref 23–29)
CREAT SERPL-MCNC: 1.1 MG/DL (ref 0.5–1.4)
DIFFERENTIAL METHOD BLD: NORMAL
EOSINOPHIL # BLD AUTO: 0.1 K/UL (ref 0–0.5)
EOSINOPHIL NFR BLD: 0.8 % (ref 0–8)
ERYTHROCYTE [DISTWIDTH] IN BLOOD BY AUTOMATED COUNT: 13.2 % (ref 11.5–14.5)
EST. GFR  (NO RACE VARIABLE): >60 ML/MIN/1.73 M^2
GLUCOSE SERPL-MCNC: 98 MG/DL (ref 70–110)
HCT VFR BLD AUTO: 46.2 % (ref 40–54)
HGB BLD-MCNC: 15.8 G/DL (ref 14–18)
IMM GRANULOCYTES # BLD AUTO: 0.02 K/UL (ref 0–0.04)
IMM GRANULOCYTES NFR BLD AUTO: 0.2 % (ref 0–0.5)
LYMPHOCYTES # BLD AUTO: 2.4 K/UL (ref 1–4.8)
LYMPHOCYTES NFR BLD: 25.4 % (ref 18–48)
MAGNESIUM SERPL-MCNC: 2.2 MG/DL (ref 1.6–2.6)
MCH RBC QN AUTO: 28.8 PG (ref 27–31)
MCHC RBC AUTO-ENTMCNC: 34.2 G/DL (ref 32–36)
MCV RBC AUTO: 84 FL (ref 82–98)
MONOCYTES # BLD AUTO: 0.5 K/UL (ref 0.3–1)
MONOCYTES NFR BLD: 5.5 % (ref 4–15)
NEUTROPHILS # BLD AUTO: 6.2 K/UL (ref 1.8–7.7)
NEUTROPHILS NFR BLD: 67.5 % (ref 38–73)
NRBC BLD-RTO: 0 /100 WBC
PLATELET # BLD AUTO: 304 K/UL (ref 150–450)
PMV BLD AUTO: 10.7 FL (ref 9.2–12.9)
POTASSIUM SERPL-SCNC: 3.8 MMOL/L (ref 3.5–5.1)
PROT SERPL-MCNC: 8.5 G/DL (ref 6–8.4)
RBC # BLD AUTO: 5.49 M/UL (ref 4.6–6.2)
SODIUM SERPL-SCNC: 139 MMOL/L (ref 136–145)
TROPONIN I SERPL DL<=0.01 NG/ML-MCNC: <0.006 NG/ML (ref 0–0.03)
TSH SERPL DL<=0.005 MIU/L-ACNC: 3.31 UIU/ML (ref 0.4–4)
WBC # BLD AUTO: 9.25 K/UL (ref 3.9–12.7)

## 2025-01-25 PROCEDURE — 85025 COMPLETE CBC W/AUTO DIFF WBC: CPT | Performed by: STUDENT IN AN ORGANIZED HEALTH CARE EDUCATION/TRAINING PROGRAM

## 2025-01-25 PROCEDURE — 25000003 PHARM REV CODE 250: Performed by: STUDENT IN AN ORGANIZED HEALTH CARE EDUCATION/TRAINING PROGRAM

## 2025-01-25 PROCEDURE — 83880 ASSAY OF NATRIURETIC PEPTIDE: CPT | Performed by: STUDENT IN AN ORGANIZED HEALTH CARE EDUCATION/TRAINING PROGRAM

## 2025-01-25 PROCEDURE — 80053 COMPREHEN METABOLIC PANEL: CPT | Performed by: STUDENT IN AN ORGANIZED HEALTH CARE EDUCATION/TRAINING PROGRAM

## 2025-01-25 PROCEDURE — 84484 ASSAY OF TROPONIN QUANT: CPT | Performed by: STUDENT IN AN ORGANIZED HEALTH CARE EDUCATION/TRAINING PROGRAM

## 2025-01-25 PROCEDURE — 84443 ASSAY THYROID STIM HORMONE: CPT | Performed by: PHYSICIAN ASSISTANT

## 2025-01-25 PROCEDURE — 83735 ASSAY OF MAGNESIUM: CPT | Performed by: PHYSICIAN ASSISTANT

## 2025-01-25 RX ORDER — HYDRALAZINE HYDROCHLORIDE 20 MG/ML
10 INJECTION INTRAMUSCULAR; INTRAVENOUS
Status: DISCONTINUED | OUTPATIENT
Start: 2025-01-25 | End: 2025-01-25

## 2025-01-25 RX ORDER — HYDROXYZINE PAMOATE 25 MG/1
25 CAPSULE ORAL
Status: COMPLETED | OUTPATIENT
Start: 2025-01-25 | End: 2025-01-25

## 2025-01-25 RX ORDER — AMLODIPINE BESYLATE 5 MG/1
5 TABLET ORAL DAILY
Qty: 30 TABLET | Refills: 0 | Status: SHIPPED | OUTPATIENT
Start: 2025-01-25 | End: 2025-01-27

## 2025-01-25 RX ORDER — AMLODIPINE BESYLATE 5 MG/1
5 TABLET ORAL
Status: COMPLETED | OUTPATIENT
Start: 2025-01-25 | End: 2025-01-25

## 2025-01-25 RX ORDER — AMLODIPINE BESYLATE 5 MG/1
10 TABLET ORAL DAILY
Qty: 30 TABLET | Refills: 0 | Status: SHIPPED | OUTPATIENT
Start: 2025-01-25 | End: 2025-01-25

## 2025-01-25 RX ADMIN — AMLODIPINE BESYLATE 5 MG: 5 TABLET ORAL at 03:01

## 2025-01-25 RX ADMIN — HYDROXYZINE PAMOATE 25 MG: 25 CAPSULE ORAL at 03:01
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
## 2025-01-25 NOTE — DISCHARGE INSTRUCTIONS
Take 5 mg amlodipine daily.  Monitor your blood pressure daily.  If your blood pressure is running less than 110/70 should hold the amlodipine.  Follow up with the regular physician on an outpatient basis for further workup and management of your blood pressure.  You can return for any concerning symptoms.  Thank you.

## 2025-01-25 NOTE — ED PROVIDER NOTES
Encounter Date: 1/24/2025       History     Chief Complaint   Patient presents with    Hypertension     Pt arrived in ED, c/o elevated BP for a few weeks. Pt reports feeling chest discomfort and palpitations approximately 30 minutes ago. Pt denies any SOB, abd pain or n/v/d. Pt is hypertensive in triage.     Palpitations     HPI    34-year-old male with a history of anxiety on escitalopram presents to ED for evaluation of elevated blood pressure that has been present for the past several weeks as well as an episode of palpitations and chest pressure that started 30 minutes ago.  He notes that he notes his blood pressure was high and started looking up complications of high blood pressure prior to onset of his palpitations.  He notes he is feeling anxious on exam.  Denies any shortness of breath, abdominal pain, nausea, vomiting, diarrhea, fever, chills, decreased urination.  Has never been diagnosed with high blood pressure.    Review of patient's allergies indicates:  No Known Allergies  History reviewed. No pertinent past medical history.  History reviewed. No pertinent surgical history.  No family history on file.  Social History     Tobacco Use    Smoking status: Former     Types: Cigarettes    Smokeless tobacco: Never   Substance Use Topics    Alcohol use: Yes     Comment: Occ.    Drug use: Never     Review of Systems   Constitutional:  Negative for fever.   HENT:  Negative for sore throat.    Respiratory:  Negative for shortness of breath.    Cardiovascular:  Negative for chest pain.   Gastrointestinal:  Negative for abdominal pain, nausea and vomiting.   Genitourinary:  Negative for dysuria.   Musculoskeletal:  Negative for back pain.   Skin:  Negative for rash.   Neurological:  Negative for weakness and headaches.   Hematological:  Does not bruise/bleed easily.       Physical Exam     Initial Vitals [01/24/25 2228]   BP Pulse Resp Temp SpO2   (!) 232/133 (!) 118 20 98.7 °F (37.1 °C) 98 %      MAP       --          Physical Exam    Constitutional: Vital signs are normal. He appears well-developed and well-nourished.  Non-toxic appearance. He does not have a sickly appearance. He does not appear ill.   HENT:   Head: Normocephalic and atraumatic. Mouth/Throat: Mucous membranes are normal.   Eyes: EOM are normal.   Neck: Neck supple.   Cardiovascular:  Normal rate and regular rhythm.           Pulmonary/Chest: He has no wheezes. He has no rhonchi. He has no rales.   Abdominal: Abdomen is soft. Bowel sounds are normal. There is no abdominal tenderness. There is no rebound and no guarding.   Musculoskeletal:      Cervical back: Neck supple.     Neurological: He is alert. He has normal strength. No sensory deficit. Gait normal.   Skin: Skin is warm and dry. No rash noted.   Psychiatric: His mood appears anxious.         ED Course   Procedures  Labs Reviewed   COMPREHENSIVE METABOLIC PANEL - Abnormal       Result Value    Sodium 139      Potassium 3.8      Chloride 104      CO2 26      Glucose 98      BUN 8      Creatinine 1.1      Calcium 9.9      Total Protein 8.5 (*)     Albumin 4.5      Total Bilirubin 0.4      Alkaline Phosphatase 109      AST 27      ALT 42      eGFR >60      Anion Gap 9     B-TYPE NATRIURETIC PEPTIDE    BNP 15     CBC W/ AUTO DIFFERENTIAL    WBC 9.25      RBC 5.49      Hemoglobin 15.8      Hematocrit 46.2      MCV 84      MCH 28.8      MCHC 34.2      RDW 13.2      Platelets 304      MPV 10.7      Immature Granulocytes 0.2      Gran # (ANC) 6.2      Immature Grans (Abs) 0.02      Lymph # 2.4      Mono # 0.5      Eos # 0.1      Baso # 0.06      nRBC 0      Gran % 67.5      Lymph % 25.4      Mono % 5.5      Eosinophil % 0.8      Basophil % 0.6      Differential Method Automated     TROPONIN I    Troponin I <0.006     MAGNESIUM    Magnesium 2.2     TSH    TSH 3.310            Imaging Results              X-Ray Chest PA And Lateral (Final result)  Result time 01/25/25 00:08:23      Final result by Laly  Carlitos TORO MD (01/25/25 00:08:23)                   Impression:      No acute cardiopulmonary process identified.      Electronically signed by: Carlitos Ruffin MD  Date:    01/25/2025  Time:    00:08               Narrative:    EXAMINATION:  XR CHEST PA AND LATERAL    CLINICAL HISTORY:  Elevated blood-pressure reading, without diagnosis of hypertension    TECHNIQUE:  PA and lateral views of the chest were performed.    COMPARISON:  None    FINDINGS:  Cardiac silhouette is normal in size.  Lungs are symmetrically expanded.  No evidence of focal consolidative process, pneumothorax, or significant pleural effusion.  No acute osseous abnormality identified.                                       Medications   hydrOXYzine pamoate capsule 25 mg (25 mg Oral Given 1/25/25 0356)   amLODIPine tablet 5 mg (5 mg Oral Given 1/25/25 0356)     Medical Decision Making  Risk  Prescription drug management.     systolic in the room, , RR 20, SpO2 98%  Patient is anxious appearing on exam   Notes that he was looking up complications of high blood pressure before he had palpitations  Initial EKG when he arrived showed sinus tachycardia with a rate of 115; intervals, segments, waves all unremarkable  Notes he has a palpitations in the past but has never seen a cardiologist  Given 25 hydroxyzine, 5 mgof p.o. amlodipine   Troponin negative   Basic labs otherwise reviewed and unremarkable  Repeat blood pressure 160/110  I have a low suspicion for ACS, hypertensive emergency, arrhythmia  Might have undiagnosed primary hypertension  Started on amlodipine 5 mg and discharged with the advice to follow up with his regular physician for outpatient workup  Noted that if his blood pressure is running less than 110/70 then he should hold the amlodipine    I discussed with the patient/family the diagnosis, treatment plan, indications for return to the emergency department, and for expected follow-up. The patient/family verbalized an  understanding. The patient/family is asked if there are any questions or concerns. We discuss the case, until all issues are addressed to the patient/familys satisfaction. Patient/family understands and is agreeable to the plan.   Terrell Nascimento    DISCLAIMER: This note was prepared with [employer] voice recognition transcription software. Garbled syntax, mangled pronouns, and other bizarre constructions may be attributed to that software system.                                  Clinical Impression:  Final diagnoses:  [R03.0] Elevated blood pressure reading          ED Disposition Condition    Discharge Stable          ED Prescriptions       Medication Sig Dispense Start Date End Date Auth. Provider    amLODIPine (NORVASC) 5 MG tablet  (Status: Discontinued) Take 2 tablets (10 mg total) by mouth once daily. 30 tablet 1/25/2025 1/25/2025 Terrell Nascimento MD    amLODIPine (NORVASC) 5 MG tablet Take 1 tablet (5 mg total) by mouth once daily. 30 tablet 1/25/2025 2/24/2025 Terrell Nascimento MD          Follow-up Information    None          Terrell Nascimento MD  01/25/25 6579

## 2025-01-27 ENCOUNTER — OFFICE VISIT (OUTPATIENT)
Dept: FAMILY MEDICINE | Facility: CLINIC | Age: 35
End: 2025-01-27
Payer: COMMERCIAL

## 2025-01-27 ENCOUNTER — CLINICAL SUPPORT (OUTPATIENT)
Dept: FAMILY MEDICINE | Facility: CLINIC | Age: 35
End: 2025-01-27
Payer: COMMERCIAL

## 2025-01-27 ENCOUNTER — PATIENT MESSAGE (OUTPATIENT)
Dept: FAMILY MEDICINE | Facility: CLINIC | Age: 35
End: 2025-01-27

## 2025-01-27 VITALS
BODY MASS INDEX: 33.98 KG/M2 | HEART RATE: 90 BPM | HEIGHT: 67 IN | OXYGEN SATURATION: 96 % | DIASTOLIC BLOOD PRESSURE: 118 MMHG | TEMPERATURE: 99 F | RESPIRATION RATE: 18 BRPM | SYSTOLIC BLOOD PRESSURE: 160 MMHG | WEIGHT: 216.5 LBS

## 2025-01-27 VITALS — DIASTOLIC BLOOD PRESSURE: 100 MMHG | SYSTOLIC BLOOD PRESSURE: 150 MMHG

## 2025-01-27 DIAGNOSIS — E78.2 MIXED HYPERLIPIDEMIA: ICD-10-CM

## 2025-01-27 DIAGNOSIS — I10 ESSENTIAL HYPERTENSION: Primary | ICD-10-CM

## 2025-01-27 DIAGNOSIS — R03.0 ELEVATED BLOOD PRESSURE READING: Primary | ICD-10-CM

## 2025-01-27 DIAGNOSIS — I10 ESSENTIAL HYPERTENSION: ICD-10-CM

## 2025-01-27 PROCEDURE — 1160F RVW MEDS BY RX/DR IN RCRD: CPT | Mod: CPTII,S$GLB,,

## 2025-01-27 PROCEDURE — 99214 OFFICE O/P EST MOD 30 MIN: CPT | Mod: S$GLB,,,

## 2025-01-27 PROCEDURE — 3077F SYST BP >= 140 MM HG: CPT | Mod: CPTII,S$GLB,,

## 2025-01-27 PROCEDURE — 99999 PR PBB SHADOW E&M-EST. PATIENT-LVL IV: CPT | Mod: PBBFAC,,,

## 2025-01-27 PROCEDURE — 3008F BODY MASS INDEX DOCD: CPT | Mod: CPTII,S$GLB,,

## 2025-01-27 PROCEDURE — 99999 PR PBB SHADOW E&M-EST. PATIENT-LVL I: CPT | Mod: PBBFAC,,,

## 2025-01-27 PROCEDURE — 3044F HG A1C LEVEL LT 7.0%: CPT | Mod: CPTII,S$GLB,,

## 2025-01-27 PROCEDURE — 1159F MED LIST DOCD IN RCRD: CPT | Mod: CPTII,S$GLB,,

## 2025-01-27 PROCEDURE — 3080F DIAST BP >= 90 MM HG: CPT | Mod: CPTII,S$GLB,,

## 2025-01-27 RX ORDER — AMLODIPINE BESYLATE 10 MG/1
10 TABLET ORAL DAILY
Qty: 90 TABLET | Refills: 0 | Status: SHIPPED | OUTPATIENT
Start: 2025-01-27 | End: 2025-01-27 | Stop reason: SDUPTHER

## 2025-01-27 RX ORDER — AMLODIPINE BESYLATE 10 MG/1
10 TABLET ORAL DAILY
Qty: 90 TABLET | Refills: 0 | Status: SHIPPED | OUTPATIENT
Start: 2025-01-27 | End: 2025-04-27

## 2025-01-27 RX ORDER — ROSUVASTATIN CALCIUM 10 MG/1
10 TABLET, COATED ORAL NIGHTLY
Qty: 90 TABLET | Refills: 0 | Status: SHIPPED | OUTPATIENT
Start: 2025-01-27 | End: 2025-01-27 | Stop reason: SDUPTHER

## 2025-01-27 RX ORDER — ROSUVASTATIN CALCIUM 10 MG/1
10 TABLET, COATED ORAL NIGHTLY
Qty: 90 TABLET | Refills: 0 | Status: SHIPPED | OUTPATIENT
Start: 2025-01-27 | End: 2025-04-27

## 2025-01-27 NOTE — TELEPHONE ENCOUNTER
No care due was identified.  [facility] Embedded Care Due Messages. Reference number: [id number].   1/27/2025 3:43:16 PM CST

## 2025-01-27 NOTE — PROGRESS NOTES
Favio Diggs 34 y.o. male is here today for Blood Pressure check.   History of HTN yes.    Review of patient's allergies indicates:  No Known Allergies  Creatinine   Date Value Ref Range Status   01/25/2025 1.1 0.5 - 1.4 mg/dL Final     Sodium   Date Value Ref Range Status   01/25/2025 139 136 - 145 mmol/L Final     Potassium   Date Value Ref Range Status   01/25/2025 3.8 3.5 - 5.1 mmol/L Final   ]  Patient verifies taking blood pressure medications on a regular basis at the same time of the day.     Current Outpatient Medications:     amLODIPine (NORVASC) 5 MG tablet, Take 1 tablet (5 mg total) by mouth once daily., Disp: 30 tablet, Rfl: 0    dextroamphetamine-amphetamine (ADDERALL XR) 10 MG 24 hr capsule, Take 1 capsule by mouth every morning (Patient not taking: Reported on 10/25/2024), Disp: 30 capsule, Rfl: 0    dextroamphetamine-amphetamine (ADDERALL XR) 15 MG 24 hr capsule, Take 1 capsule by mouth every morning (Patient not taking: Reported on 10/25/2024), Disp: 30 capsule, Rfl: 0    dextroamphetamine-amphetamine (ADDERALL XR) 15 MG 24 hr capsule, Take 1 capsule by mouth every morning, Disp: 30 capsule, Rfl: 0    EScitalopram oxalate (LEXAPRO) 20 MG tablet, TAKE 1 TABLET BY MOUTH EVERY DAY, Disp: 90 tablet, Rfl: 0    EScitalopram oxalate (LEXAPRO) 20 MG tablet, TAKE 1 TABLET BY MOUTH EVERY DAY (Patient not taking: Reported on 10/25/2024), Disp: 90 tablet, Rfl: 0    EScitalopram oxalate (LEXAPRO) 20 MG tablet, TAKE 1 TABLET BY MOUTH EVERY DAY, Disp: 90 tablet, Rfl: 0    nystatin-triamcinolone (MYCOLOG II) cream, Apply topically 2 (two) times daily., Disp: 60 g, Rfl: 1  Does patient have record of home blood pressure readings no. Readings have been averaging states has still been elevated.   Last dose of blood pressure medication was taken at this morning.  Patient is asymptomatic.   Complains of no complaints.    Vitals:    01/27/25 0818   BP: (!) 150/100   BP Location: Right arm   Patient  Position: Sitting         Dr. Mcallister informed of nurse visit.   Pt went to emergency room on Friday and was started on amlodipine, bp still elevated; scheduled appt with PCP for today per pt request

## 2025-01-27 NOTE — PROGRESS NOTES
Family Medicine     Patient name: Favio Diggs  MRN: [id number]  : 1990  PCP NAME: Della Mcallister MD    Subjective     History of Present Illness:  Patient ID: Favio Diggs is a 34 y.o. Other male presents to the clinic today. Chronic medical issues, if present, have been documented.   Active Problem List with Overview Notes    Diagnosis Date Noted    Attention deficit hyperactivity disorder (ADHD) 2025       Chief Complaint  Chief Complaint   Patient presents with    Follow-up     Er visit      Presents for follow-up for newly diagnosed hypertension.  Since my last visit with him, he has had to visit ER on account of palpitations and severely elevated blood pressure.  Cardiac evaluation was unremarkable.  Was given 5 mg of amlodipine.   He reports to be feeling at baseline. No chest pain, shortness of breath on exertion, palpitations or presyncopal/syncopal symptoms.  He has been minimized caffeine.  He has stopped Adderall.      Medications   Medication List with Changes/Refills   New Medications    AMLODIPINE (NORVASC) 10 MG TABLET    Take 1 tablet (10 mg total) by mouth once daily.    ROSUVASTATIN (CRESTOR) 10 MG TABLET    Take 1 tablet (10 mg total) by mouth every evening.   Current Medications    DEXTROAMPHETAMINE-AMPHETAMINE (ADDERALL XR) 10 MG 24 HR CAPSULE    Take 1 capsule by mouth every morning    DEXTROAMPHETAMINE-AMPHETAMINE (ADDERALL XR) 15 MG 24 HR CAPSULE    Take 1 capsule by mouth every morning    DEXTROAMPHETAMINE-AMPHETAMINE (ADDERALL XR) 15 MG 24 HR CAPSULE    Take 1 capsule by mouth every morning    ESCITALOPRAM OXALATE (LEXAPRO) 20 MG TABLET    TAKE 1 TABLET BY MOUTH EVERY DAY    ESCITALOPRAM OXALATE (LEXAPRO) 20 MG TABLET    TAKE 1 TABLET BY MOUTH EVERY DAY    ESCITALOPRAM OXALATE (LEXAPRO) 20 MG TABLET    TAKE 1 TABLET BY MOUTH EVERY DAY    NYSTATIN-TRIAMCINOLONE (MYCOLOG II) CREAM    Apply topically 2 (two) times daily.  "  Discontinued Medications    AMLODIPINE (NORVASC) 5 MG TABLET    Take 1 tablet (5 mg total) by mouth once daily.       Allergies  Review of patient's allergies indicates:  No Known Allergies  Past Medical history  History reviewed. No pertinent past medical history.       Surgical History  History reviewed. No pertinent surgical history.  Family History  No family history on file.  Social History  Social History     Tobacco Use    Smoking status: Former     Types: Cigarettes    Smokeless tobacco: Never   Substance Use Topics    Alcohol use: Yes     Comment: Occ.    Drug use: Never          Review of system     ROS  Negative except as mentioned above  Physical exam   Vital Signs  Vitals:    01/27/25 1017   BP: (!) 160/118   Pulse: 90   Resp: 18   Temp: 98.7 °F (37.1 °C)   TempSrc: Oral   SpO2: 96%   Weight: 98.2 kg (216 lb 7.9 oz)   Height: 5' 7" (1.702 m)       Physical Exam  Constitutional:       General: He is not in acute distress.     Appearance: He is not ill-appearing.   HENT:      Head: Normocephalic.   Cardiovascular:      Rate and Rhythm: Normal rate and regular rhythm.      Pulses: Normal pulses.      Heart sounds: Normal heart sounds. No murmur heard.     No gallop.   Pulmonary:      Effort: Pulmonary effort is normal. No respiratory distress.      Breath sounds: Normal breath sounds. No wheezing.   Abdominal:      General: There is no distension.      Palpations: Abdomen is soft.      Tenderness: There is no abdominal tenderness.   Musculoskeletal:      Right lower leg: No edema.      Left lower leg: No edema.   Neurological:      Mental Status: He is alert and oriented to person, place, and time.   Psychiatric:         Mood and Affect: Mood normal.         Wt Readings from Last 3 Encounters:   01/27/25 98.2 kg (216 lb 7.9 oz)   01/24/25 98 kg (216 lb)   01/17/25 98.4 kg (216 lb 14.9 oz)          Body mass index is 33.91 kg/m².      Laboratory data and other diagnostic findings     Lab Results "   Component Value Date    WBC 9.25 01/25/2025    HGB 15.8 01/25/2025    HCT 46.2 01/25/2025    MCV 84 01/25/2025     01/25/2025         Lab Results   Component Value Date    CREATININE 1.1 01/25/2025    BUN 8 01/25/2025     01/25/2025    K 3.8 01/25/2025     01/25/2025    CO2 26 01/25/2025         Assessment and plan       Essential hypertension  BP Readings from Last 1 Encounters:   01/27/25 (!) 160/118            Chronic.  Blood pressures today is still not at target.  Increase amlodipine from 5 to 10 mg.  Other lifestyle and conservative measures discussed.  -     amLODIPine (NORVASC) 10 MG tablet; Take 1 tablet (10 mg total) by mouth once daily.  Dispense: 90 tablet; Refill: 0    Mixed hyperlipidemia   Latest Reference Range & Units 03/06/23 07:37 01/24/25 08:02   LDL Cholesterol 63.0 - 159.0 mg/dL 160.0 (H) 177.0 (H)     Has family history of hyperlipidemia.  Start cholesterol-lowering medication.  Recheck prior to next visit  -     rosuvastatin (CRESTOR) 10 MG tablet; Take 1 tablet (10 mg total) by mouth every evening.  Dispense: 90 tablet; Refill: 0  -     LIPID PANEL; Future; Expected date: 04/27/2025           Problem List Items Addressed This Visit    None  Visit Diagnoses       Essential hypertension    -  Primary    Relevant Medications    amLODIPine (NORVASC) 10 MG tablet    Mixed hyperlipidemia        Relevant Medications    rosuvastatin (CRESTOR) 10 MG tablet    Other Relevant Orders    LIPID PANEL                Medications Administered this visit       Future Appointments  Future Appointments   Date Time Provider Department Center   2/10/2025  8:20 AM NURSE, [facility] [place]   4/22/2025  7:00 AM LAB, [facility] [place]   4/28/2025  8:20 AM Della Mcallister MD [facility] [place]         Follow up in about 2 weeks (around 2/10/2025) for Blood pressure check. and PRN.      Della Mcallister MD    This office note was created by combination  of typed  and MModal dictation.  Transcription errors may be present.  If there are any questions, please contact me.        Patient/Caregiver provided printed discharge information.

## 2025-01-27 NOTE — PROGRESS NOTES
Health Maintenance Due   Topic Date Due    COVID-19 Vaccine (3 - 2024-25 season) Consult PCP

## 2025-01-27 NOTE — PATIENT INSTRUCTIONS
- Eat primarily plant-based foods, such as fruits and vegetables, whole grains, legumes (beans) and nuts  - Limit refined carbohydrates (white pasta, bread, rice).  - Replace butter with healthy fats such as olive oil.  - Use herbs and spices instead of salt to flavor foods.  - Limit red meat and processed meats to no more than a few times a month.  - Avoid sugary sodas, bakery goods, and sweets.  - Eat fish and poultry at least twice a week.  - Get plenty of exercise (150 minutes per week).  STOP ADDERALL  MINIMIZE CAFFEINE

## 2025-02-10 ENCOUNTER — CLINICAL SUPPORT (OUTPATIENT)
Dept: FAMILY MEDICINE | Facility: CLINIC | Age: 35
End: 2025-02-10
Payer: COMMERCIAL

## 2025-02-10 VITALS — DIASTOLIC BLOOD PRESSURE: 88 MMHG | SYSTOLIC BLOOD PRESSURE: 130 MMHG

## 2025-02-10 DIAGNOSIS — I10 ESSENTIAL HYPERTENSION: Primary | ICD-10-CM

## 2025-02-10 PROCEDURE — 99999 PR PBB SHADOW E&M-EST. PATIENT-LVL I: CPT | Mod: PBBFAC,,,

## 2025-02-10 NOTE — PROGRESS NOTES
Favio RahmanUcheSanches 34 y.o. male is here today for Blood Pressure check.   History of HTN yes.    Review of patient's allergies indicates:  No Known Allergies  Creatinine   Date Value Ref Range Status   01/25/2025 1.1 0.5 - 1.4 mg/dL Final     Sodium   Date Value Ref Range Status   01/25/2025 139 136 - 145 mmol/L Final     Potassium   Date Value Ref Range Status   01/25/2025 3.8 3.5 - 5.1 mmol/L Final   ]  Patient verifies taking blood pressure medications on a regular basis at the same time of the day.     Current Outpatient Medications:     amLODIPine (NORVASC) 10 MG tablet, Take 1 tablet (10 mg total) by mouth once daily., Disp: 90 tablet, Rfl: 0    dextroamphetamine-amphetamine (ADDERALL XR) 10 MG 24 hr capsule, Take 1 capsule by mouth every morning (Patient not taking: Reported on 10/25/2024), Disp: 30 capsule, Rfl: 0    dextroamphetamine-amphetamine (ADDERALL XR) 15 MG 24 hr capsule, Take 1 capsule by mouth every morning (Patient not taking: Reported on 10/25/2024), Disp: 30 capsule, Rfl: 0    dextroamphetamine-amphetamine (ADDERALL XR) 15 MG 24 hr capsule, Take 1 capsule by mouth every morning, Disp: 30 capsule, Rfl: 0    EScitalopram oxalate (LEXAPRO) 20 MG tablet, TAKE 1 TABLET BY MOUTH EVERY DAY (Patient not taking: Reported on 1/27/2025), Disp: 90 tablet, Rfl: 0    EScitalopram oxalate (LEXAPRO) 20 MG tablet, TAKE 1 TABLET BY MOUTH EVERY DAY (Patient not taking: Reported on 10/25/2024), Disp: 90 tablet, Rfl: 0    EScitalopram oxalate (LEXAPRO) 20 MG tablet, TAKE 1 TABLET BY MOUTH EVERY DAY (Patient not taking: Reported on 1/27/2025), Disp: 90 tablet, Rfl: 0    nystatin-triamcinolone (MYCOLOG II) cream, Apply topically 2 (two) times daily., Disp: 60 g, Rfl: 1    rosuvastatin (CRESTOR) 10 MG tablet, Take 1 tablet (10 mg total) by mouth every evening., Disp: 90 tablet, Rfl: 0  Does patient have record of home blood pressure readings no. Readings have been averaging states has been 120-130's.   Last  dose of blood pressure medication was taken at yesterday morning.  Patient is asymptomatic.   Complains of no complaints.    Vitals:    02/10/25 0818   BP: 130/88   BP Location: Right arm   Patient Position: Sitting         Dr. Mcallister informed of nurse visit. Pt has 3 month f/u scheduled for april

## 2025-04-22 ENCOUNTER — LAB VISIT (OUTPATIENT)
Dept: LAB | Facility: HOSPITAL | Age: 35
End: 2025-04-22
Payer: COMMERCIAL

## 2025-04-22 DIAGNOSIS — E78.2 MIXED HYPERLIPIDEMIA: ICD-10-CM

## 2025-04-22 DIAGNOSIS — R03.0 ELEVATED BLOOD PRESSURE READING: ICD-10-CM

## 2025-04-22 LAB
CHOLEST SERPL-MCNC: 178 MG/DL (ref 120–199)
CHOLEST/HDLC SERPL: 4.7 {RATIO} (ref 2–5)
HDLC SERPL-MCNC: 38 MG/DL (ref 40–75)
HDLC SERPL: 21.3 % (ref 20–50)
LDLC SERPL CALC-MCNC: 121 MG/DL (ref 63–159)
NONHDLC SERPL-MCNC: 140 MG/DL
TRIGL SERPL-MCNC: 95 MG/DL (ref 30–150)

## 2025-04-22 PROCEDURE — 84244 ASSAY OF RENIN: CPT

## 2025-04-22 PROCEDURE — 36415 COLL VENOUS BLD VENIPUNCTURE: CPT | Mod: PO

## 2025-04-22 PROCEDURE — 80061 LIPID PANEL: CPT

## 2025-04-23 ENCOUNTER — LAB VISIT (OUTPATIENT)
Dept: LAB | Facility: HOSPITAL | Age: 35
End: 2025-04-23
Payer: COMMERCIAL

## 2025-04-23 DIAGNOSIS — R03.0 ELEVATED BLOOD PRESSURE READING: ICD-10-CM

## 2025-04-23 DIAGNOSIS — Z00.00 ANNUAL PHYSICAL EXAM: ICD-10-CM

## 2025-04-23 LAB
ALBUMIN/CREAT UR: 12.1 UG/MG
BILIRUB UR QL STRIP.AUTO: NEGATIVE
CLARITY UR: CLEAR
COLOR UR AUTO: YELLOW
CREAT UR-MCNC: 124 MG/DL (ref 23–375)
GLUCOSE UR QL STRIP: NEGATIVE
HGB UR QL STRIP: NEGATIVE
KETONES UR QL STRIP: NEGATIVE
LEUKOCYTE ESTERASE UR QL STRIP: NEGATIVE
MICROALBUMIN UR-MCNC: 15 UG/ML (ref ?–5000)
NITRITE UR QL STRIP: NEGATIVE
PH UR STRIP: 6 [PH]
PROT UR QL STRIP: NEGATIVE
SP GR UR STRIP: 1.01
UROBILINOGEN UR STRIP-ACNC: NEGATIVE EU/DL

## 2025-04-23 PROCEDURE — 82570 ASSAY OF URINE CREATININE: CPT

## 2025-04-23 PROCEDURE — 81003 URINALYSIS AUTO W/O SCOPE: CPT

## 2025-04-28 DIAGNOSIS — E78.2 MIXED HYPERLIPIDEMIA: ICD-10-CM

## 2025-04-28 LAB
ALDOST SERPL-MCNC: 27.8 NG/DL
ALDOST/RENIN PLAS-RTO: 19.9 RATIO
RENIN PLAS-CCNC: 1.4 NG/ML/HR

## 2025-04-28 RX ORDER — ROSUVASTATIN CALCIUM 10 MG/1
10 TABLET, COATED ORAL NIGHTLY
Qty: 90 TABLET | Refills: 0 | Status: SHIPPED | OUTPATIENT
Start: 2025-04-28 | End: 2025-07-27

## 2025-04-28 NOTE — TELEPHONE ENCOUNTER
No care due was identified.  [facility] Embedded Care Due Messages. Reference number: [id number].   4/28/2025 9:52:57 AM CDT

## 2025-04-30 ENCOUNTER — RESULTS FOLLOW-UP (OUTPATIENT)
Dept: FAMILY MEDICINE | Facility: CLINIC | Age: 35
End: 2025-04-30

## 2025-05-09 DIAGNOSIS — I10 ESSENTIAL HYPERTENSION: ICD-10-CM

## 2025-05-09 RX ORDER — AMLODIPINE BESYLATE 10 MG/1
10 TABLET ORAL DAILY
Qty: 90 TABLET | Refills: 1 | Status: SHIPPED | OUTPATIENT
Start: 2025-05-09 | End: 2025-11-05

## 2025-05-09 NOTE — TELEPHONE ENCOUNTER
No care due was identified.  [facility] Embedded Care Due Messages. Reference number: [id number].   5/09/2025 11:51:30 AM CDT

## 2025-05-16 ENCOUNTER — OFFICE VISIT (OUTPATIENT)
Dept: FAMILY MEDICINE | Facility: CLINIC | Age: 35
End: 2025-05-16
Payer: COMMERCIAL

## 2025-05-16 VITALS
WEIGHT: 214.75 LBS | TEMPERATURE: 98 F | HEIGHT: 67 IN | HEART RATE: 89 BPM | RESPIRATION RATE: 17 BRPM | OXYGEN SATURATION: 98 % | BODY MASS INDEX: 33.71 KG/M2 | SYSTOLIC BLOOD PRESSURE: 138 MMHG | DIASTOLIC BLOOD PRESSURE: 94 MMHG

## 2025-05-16 DIAGNOSIS — I10 ESSENTIAL HYPERTENSION: ICD-10-CM

## 2025-05-16 DIAGNOSIS — E78.2 MIXED HYPERLIPIDEMIA: Primary | ICD-10-CM

## 2025-05-16 PROCEDURE — 99999 PR PBB SHADOW E&M-EST. PATIENT-LVL IV: CPT | Mod: PBBFAC,,,

## 2025-05-16 NOTE — PROGRESS NOTES
Family Medicine     Patient name: Favio Diggs  MRN: [id number]  : 1990  PCP NAME: Della Mcallister MD    Subjective     History of Present Illness:  Patient ID: Favio Diggs is a 34 y.o. Other male presents to the clinic today. Chronic medical issues, if present, have been documented.   Active Problem List with Overview Notes    Diagnosis Date Noted    Attention deficit hyperactivity disorder (ADHD) 2025       Chief Complaint  Chief Complaint   Patient presents with    Follow-up      Presents for follow-up of hypertension and hyperlipidemia.  Has been compliant  with his medication.   His dose of Adderall was also reduced.  His palpitations have resolved. He is excited to become a first time father.     Medications   Medication List with Changes/Refills   Current Medications    AMLODIPINE (NORVASC) 10 MG TABLET    Take 1 tablet (10 mg total) by mouth once daily.    DEXTROAMPHETAMINE-AMPHETAMINE (ADDERALL XR) 10 MG 24 HR CAPSULE    Take 1 capsule by mouth every morning    DEXTROAMPHETAMINE-AMPHETAMINE (ADDERALL XR) 15 MG 24 HR CAPSULE    Take 1 capsule by mouth every morning    DEXTROAMPHETAMINE-AMPHETAMINE (ADDERALL XR) 15 MG 24 HR CAPSULE    Take 1 capsule by mouth every morning    ESCITALOPRAM OXALATE (LEXAPRO) 20 MG TABLET    TAKE 1 TABLET BY MOUTH EVERY DAY    ESCITALOPRAM OXALATE (LEXAPRO) 20 MG TABLET    TAKE 1 TABLET BY MOUTH EVERY DAY    ESCITALOPRAM OXALATE (LEXAPRO) 20 MG TABLET    TAKE 1 TABLET BY MOUTH EVERY DAY    NYSTATIN-TRIAMCINOLONE (MYCOLOG II) CREAM    Apply topically 2 (two) times daily.    ROSUVASTATIN (CRESTOR) 10 MG TABLET    Take 1 tablet (10 mg total) by mouth every evening.       Allergies  Review of patient's allergies indicates:  No Known Allergies  Past Medical history  No past medical history on file.       Surgical History  No past surgical history on file.  Family History  No family history on file.  Social History  Social  "History[1]       Review of system     ROS  Negative except as mentioned above  Physical exam   Vital Signs  Vitals:    05/16/25 0843   BP: (!) 138/94   Pulse: 89   Resp: 17   Temp: 98.3 °F (36.8 °C)   TempSrc: Oral   SpO2: 98%   Weight: 97.4 kg (214 lb 11.7 oz)   Height: 5' 7" (1.702 m)       Physical Exam  Constitutional:       General: He is not in acute distress.     Appearance: He is not ill-appearing.   HENT:      Head: Normocephalic.   Cardiovascular:      Rate and Rhythm: Normal rate and regular rhythm.      Pulses: Normal pulses.      Heart sounds: Normal heart sounds. No murmur heard.     No gallop.   Pulmonary:      Effort: Pulmonary effort is normal. No respiratory distress.      Breath sounds: Normal breath sounds. No wheezing.   Abdominal:      General: There is no distension.      Palpations: Abdomen is soft.      Tenderness: There is no abdominal tenderness.   Musculoskeletal:      Right lower leg: No edema.      Left lower leg: No edema.   Neurological:      Mental Status: He is alert and oriented to person, place, and time.   Psychiatric:         Mood and Affect: Mood normal.           Wt Readings from Last 3 Encounters:   05/16/25 97.4 kg (214 lb 11.7 oz)   01/27/25 98.2 kg (216 lb 7.9 oz)   01/24/25 98 kg (216 lb)          Body mass index is 33.63 kg/m².      Laboratory data and other diagnostic findings     Lab Results   Component Value Date    WBC 9.25 01/25/2025    HGB 15.8 01/25/2025    HCT 46.2 01/25/2025    MCV 84 01/25/2025     01/25/2025         Lab Results   Component Value Date    CREATININE 1.1 01/25/2025    BUN 8 01/25/2025     01/25/2025    K 3.8 01/25/2025     01/25/2025    CO2 26 01/25/2025         Assessment and plan       Mixed hyperlipidemia   Latest Reference Range & Units 01/24/25 08:02 04/22/25 07:18   LDL Cholesterol 63.0 - 159.0 mg/dL 177.0 (H) 121.0   (H): Data is abnormally high  LDL has significantly reduced since commencing rosuvastatin.    Recommend " continue medication  -     Lipid Panel; Future; Expected date: 11/16/2025    Essential hypertension  BP Readings from Last 3 Encounters:   05/16/25 (!) 138/94   02/10/25 130/88   01/27/25 (!) 160/118     Patient's blood pressure control is improved.The benefits of comprehensive lifestyle modification were discussed. Patient is encouraged to continue to restrict the quantity of salt in diet and engage in regular aerobic/resistance exercise   Continue amlodipine.  -     Comprehensive Metabolic Panel; Future; Expected date: 11/16/2025           Problem List Items Addressed This Visit    None  Visit Diagnoses         Mixed hyperlipidemia    -  Primary    Relevant Orders    Lipid Panel      Essential hypertension        Relevant Orders    Comprehensive Metabolic Panel                   Future Appointments  Future Appointments   Date Time Provider Department Center   11/14/2025  7:00 AM LAB, [facility] [place]   11/21/2025  8:20 AM Della Mcallister MD [facility]         Follow up in about 6 months (around 11/16/2025).       Della Mcallister MD    This office note was created by combination of typed  and MModal dictation.  Transcription errors may be present.  If there are any questions, please contact me.               [1]   Social History  Tobacco Use    Smoking status: Former     Types: Cigarettes    Smokeless tobacco: Never   Substance Use Topics    Alcohol use: Yes     Comment: Occ.    Drug use: Never

## 2025-06-09 ENCOUNTER — PATIENT MESSAGE (OUTPATIENT)
Dept: ADMINISTRATIVE | Facility: HOSPITAL | Age: 35
End: 2025-06-09
Payer: COMMERCIAL

## 2025-08-04 DIAGNOSIS — E78.2 MIXED HYPERLIPIDEMIA: ICD-10-CM

## 2025-08-04 RX ORDER — ROSUVASTATIN CALCIUM 10 MG/1
10 TABLET, COATED ORAL NIGHTLY
Qty: 90 TABLET | Refills: 0 | Status: SHIPPED | OUTPATIENT
Start: 2025-08-04 | End: 2025-11-02

## 2025-08-04 NOTE — TELEPHONE ENCOUNTER
No care due was identified.  [facility] Embedded Care Due Messages. Reference number: [id number].   8/04/2025 3:04:58 PM CDT